# Patient Record
Sex: MALE | Race: BLACK OR AFRICAN AMERICAN | NOT HISPANIC OR LATINO | Employment: STUDENT | URBAN - METROPOLITAN AREA
[De-identification: names, ages, dates, MRNs, and addresses within clinical notes are randomized per-mention and may not be internally consistent; named-entity substitution may affect disease eponyms.]

---

## 2019-07-06 ENCOUNTER — HOSPITAL ENCOUNTER (EMERGENCY)
Facility: HOSPITAL | Age: 24
Discharge: HOME | End: 2019-07-07
Attending: EMERGENCY MEDICINE
Payer: COMMERCIAL

## 2019-07-06 DIAGNOSIS — S76.212A GROIN STRAIN, LEFT, INITIAL ENCOUNTER: ICD-10-CM

## 2019-07-06 DIAGNOSIS — K62.5 RECTAL BLEEDING: Primary | ICD-10-CM

## 2019-07-06 DIAGNOSIS — K59.00 CONSTIPATION: ICD-10-CM

## 2019-07-06 PROCEDURE — 99284 EMERGENCY DEPT VISIT MOD MDM: CPT | Mod: ,,, | Performed by: EMERGENCY MEDICINE

## 2019-07-06 PROCEDURE — 99284 PR EMERGENCY DEPT VISIT,LEVEL IV: ICD-10-PCS | Mod: ,,, | Performed by: EMERGENCY MEDICINE

## 2019-07-06 PROCEDURE — 99283 EMERGENCY DEPT VISIT LOW MDM: CPT

## 2019-07-06 RX ORDER — POLYETHYLENE GLYCOL 3350 17 G/17G
17 POWDER, FOR SOLUTION ORAL DAILY
Qty: 507 G | Refills: 0 | Status: SHIPPED | OUTPATIENT
Start: 2019-07-06

## 2019-07-07 VITALS
HEART RATE: 82 BPM | TEMPERATURE: 99 F | OXYGEN SATURATION: 96 % | SYSTOLIC BLOOD PRESSURE: 107 MMHG | RESPIRATION RATE: 18 BRPM | BODY MASS INDEX: 18.64 KG/M2 | DIASTOLIC BLOOD PRESSURE: 57 MMHG | HEIGHT: 68 IN | WEIGHT: 123 LBS

## 2019-07-07 NOTE — DISCHARGE INSTRUCTIONS
Follow-up with primary provider on Wednesday, pursue anoscope study.  Return to the ED immediately if you develop substantial bleeding from rectum, fever, vomiting or severe constipation unrelieved by MiraLax.    Our goal in the emergency department is to always give you outstanding care and exceptional service. You may receive a survey by mail or e-mail in the next week regarding your experience in our ED. We would greatly appreciate your completing and returning the survey. Your feedback provides us with a way to recognize our staff who give very good care and it helps us learn how to improve when your experience was below our aspiration of excellence.

## 2019-07-07 NOTE — ED TRIAGE NOTES
"Jose Francisco Dotson, a 23 y.o. male presents to the ED w/ complaint of lower back and abdominal pain. Pt reports intermittent lower back pain that radiates to LLE. PT denies recent trauma/injury. Pt denies numbness/tingling in LLE. Pt also reports constipation. Pt stated he passed loose stool yesterday morning for the first time in six days after taking mag citrate and OTC stool softeners. Pt reports "drops of blood" every time he tries to go to the bathroom.     Triage note:  Chief Complaint   Patient presents with    Back Pain     C/o lower back pain that radiates into LLE x 2 weeks. C/o abd pain and constipation x 6 days. Took mag citrate and passed stool yesterday morning. States has been unable to pass stool since but has having bright red rectal bleeding.      Review of patient's allergies indicates:   Allergen Reactions    Sulfa (sulfonamide antibiotics) Other (See Comments)     Fever     Strawberries [strawberry]      Past Medical History:   Diagnosis Date    Allergy      Adult Physical Assessment  LOC: Jose Francisco Dotson, 23 y.o. male verified via two identifiers.  The patient is awake, alert, oriented and speaking appropriately at this time.  APPEARANCE: Patient resting comfortably and appears to be in no acute distress at this time. Patient is clean and well groomed, patient's clothing is properly fastened.  SKIN:The skin is warm and dry, color consistent with ethnicity, patient has normal skin turgor and moist mucus membranes, skin intact, no breakdown or brusing noted.  MUSCULOSKELETAL: Patient moving all extremities well, no obvious swelling or deformities noted. Pt reports lower back pain that radiates down LLE.  RESPIRATORY: Airway is open and patent, respirations are spontaneous, patient has a normal effort and rate, no accessory muscle use noted.  CARDIAC: Patient has a normal rate and rhythm, no periphreal edema noted in any extremity, capillary refill < 3 seconds in all extremities. Clubbing noted to " patient's fingernails.  ABDOMEN: Soft and non tender to palpation, no abdominal distention noted. Bowel sounds present in all four quadrants. Pt denies N/V. Pt reports constipation for 6 days.  NEUROLOGIC: Eyes open spontaneously, behavior appropriate to situation, follows commands, facial expression symmetrical, bilateral hand grasp equal and even, purposeful motor response noted, normal sensation in all extremities when touched with a finger. Denies numbness/tingling.

## 2019-07-07 NOTE — ED PROVIDER NOTES
Encounter Date: 7/6/2019       History     Chief Complaint   Patient presents with    Back Pain     C/o lower back pain that radiates into LLE x 2 weeks. C/o abd pain and constipation x 6 days. Took mag citrate and passed stool yesterday morning. States has been unable to pass stool since but has having bright red rectal bleeding.      Mr Dotson is a 23yoM who presents for left thigh pain and BRBPR; pertinent PMHx history syphilis (treated), high-risk sexual behavior.  Patient sources several weeks of bilateral lumbar back pain; he took Motrin for this pain several days ago and has been pain-free since, denies pain at this time.  He does endorse left upper thigh pain that began around time of back pain. Pain is localized proximal left thigh near the groin.  Patient also endorses constipation last week for 1 week duration.  He took multiple doses of Mag citrate and has had loose stool over the past few days.  Today he endorses bowel movement hesitancy feel like there is something blocking in my rectum.  Upon wiping and in toilet water he had a small amount of BRBPR.  Isolated episode without similar history.  Patient denies anal trauma, fever/chills, anal pain, testicular pain, penile discharge, painful/painless lesion, nausea/vomiting, numbness or weakness to BLE, urinary hesitancy or dysuria.  The patients available PMH, PSH, Social History, medications, allergies, and triage vital signs were reviewed just prior to their medical evaluation.  A ten point review of systems was completed and is negative except as documented above.  Patient denies any other acute medical complaint.          Review of patient's allergies indicates:   Allergen Reactions    Sulfa (sulfonamide antibiotics) Other (See Comments)     Fever     Strawberries [strawberry]      Past Medical History:   Diagnosis Date    Allergy      History reviewed. No pertinent surgical history.  Family History   Problem Relation Age of Onset    Cancer  Father      Social History     Tobacco Use    Smoking status: Never Smoker    Smokeless tobacco: Never Used   Substance Use Topics    Alcohol use: No     Alcohol/week: 0.0 oz    Drug use: No     Review of Systems   Constitutional: Negative for chills and fever.   Respiratory: Negative for cough and shortness of breath.    Cardiovascular: Negative for chest pain and palpitations.   Gastrointestinal: Positive for anal bleeding. Negative for abdominal distention, abdominal pain, blood in stool, constipation, diarrhea, nausea and vomiting.   Genitourinary: Negative for decreased urine volume, discharge, dysuria, flank pain, frequency, hematuria, scrotal swelling and testicular pain.   Musculoskeletal: Positive for myalgias. Negative for arthralgias, back pain, gait problem, joint swelling, neck pain and neck stiffness.   Skin: Negative for pallor and rash.   Neurological: Negative for dizziness, weakness and headaches.   Psychiatric/Behavioral: Negative for confusion.       Physical Exam     Initial Vitals [07/06/19 2212]   BP Pulse Resp Temp SpO2   122/77 88 16 99 °F (37.2 °C) 98 %      MAP       --         Physical Exam    Vitals reviewed.  Constitutional: He appears well-developed and well-nourished. He is not diaphoretic. No distress.   Well-appearing male in NAD, VSS, afebrile, 96% on RA.     HENT:   Head: Normocephalic and atraumatic.   Right Ear: External ear normal.   Left Ear: External ear normal.   Nose: Nose normal.   Mouth/Throat: Oropharynx is clear and moist. No oropharyngeal exudate.   Eyes: Conjunctivae and EOM are normal. Pupils are equal, round, and reactive to light. No scleral icterus.   Neck: Normal range of motion. Neck supple.   Cardiovascular: Normal rate, regular rhythm and intact distal pulses.   Pulmonary/Chest: Breath sounds normal.   Abdominal: Soft. There is no tenderness.   Genitourinary: Rectal exam shows guaiac positive stool. Guaiac positive stool. :  Acceptable.  Genitourinary Comments: Very minimal stool in rectal vault, guaiac positive stool.  Tenderness near flexure.  No appreciable mass felt. No external abnormalities to anus, no anal fissure   Musculoskeletal: Normal range of motion. He exhibits tenderness. He exhibits no edema.   LLE:  No bony tenderness throughout left hip or femur.   All muscle compartments are soft.  No inguinal lymphadenopathy to the left.  No overlying skin changes. + tenderness to proximal quadriceps muscle groups, + reproducible pain with hip flexion only, no pain with ab/adduction, hamstrings, quad tendon, foreleg, gluteus.   No bony tenderness throughout L-spine, SI joint or pelvis.   Neurological: He is alert and oriented to person, place, and time. He has normal strength. No cranial nerve deficit or sensory deficit.   Skin: Skin is warm and dry. Capillary refill takes less than 2 seconds. No rash noted. No erythema. No pallor.   Psychiatric: He has a normal mood and affect. His behavior is normal. Judgment and thought content normal.         ED Course   Procedures  Labs Reviewed - No data to display       Imaging Results          X-Ray Abdomen Flat And Erect (Final result)  Result time 07/07/19 00:00:15    Final result by Nikko Beltran MD (07/07/19 00:00:15)                 Impression:      Nonobstructed bowel-gas pattern.      Electronically signed by: Nikko Beltran MD  Date:    07/07/2019  Time:    00:00             Narrative:    EXAMINATION:  XR ABDOMEN FLAT AND ERECT    CLINICAL HISTORY:  Constipation, unspecified    TECHNIQUE:  Flat and erect AP views of the abdomen were preformed.    COMPARISON:  None    FINDINGS:  There are no calcifications overlying the kidneys.  Bowel gas pattern is non-obstructive.  No evidence for pneumoperitoneum.  Regional osseous structures are unremarkable.                                 Medical Decision Making:   History:   Old Medical Records: I decided to obtain old medical  records.  Old Records Summarized: records from clinic visits and records from previous admission(s).  Initial Assessment:   Patient presents for constipation 2/2 rectal hesitancy and left groin strain + pain with hip flexion only, VSS, afebrile  Differential Diagnosis:   DDx includes groin strain, internal hemorrhoid, rectal mass. Physical exam and history taking lower clinical suspicion for upper GI bleed (BRBPR x 1 with minimal stool in rectal vault, no abd pain), external hemorrhoid, anal fissure, hip trauma or dislocation, bony neoplasm, STI, refer testicular pain.  Clinical Tests:   Radiological Study: Ordered and Reviewed  ED Management:  KUB reviewed without free air or concern for ileus.  Physical exam most consistent with a groin strain, as tenderness is limited to proximal quadriceps muscles only.  Recommended conservative management.  I am concerned for rectal hesitancy the rectal exam is largely unremarkable. Patient needs very close follow-up with anoscope given history of risky sexual behavior and prior syphilis infection.  Patient has a PCP who he can follow up with this week, recommended inquiring about anoscope and further management of rectal sensation.  Given MiraLax Rx. Patient agreed to plan of care and voiced understanding. Discharged in stable condition with strict ED return precautions.    Paulina Maier PA-C  07/09/2019    I discussed the following case, diagnosis and plan of care with attending physician.                Attending Attestation:     Physician Attestation Statement for NP/PA:   I have conducted a face to face encounter with this patient in addition to the NP/PA, due to Medical Complexity    Other NP/PA Attestation Additions:    History of Present Illness: 23M with PMH HIV on PrEP, secondary syphilis s/p tx, presenting with bilateral lumbar paraspinal pain for two weeks, however he endorses resolution of this pain yesterday. Also endorses 1 wk of constipation, painful BM, had  large BM after Mag citrate.  No unintentional weight changes, no changes in appetite or nausea or vomiting.   Physical Exam: NAD, NCAT, A&Ox3, PERRL and EOMI, neck supple/normal ROM, CTAB, RRR no mrg, normal extremity ROM/m/s, abd s/nt/nd, genital exam unremarkable without penile or scrotal tenderness to palpation or lesions, no inguinal lymphadenopathy, no LE edema, skin dry and warm. Rectal exam deferred to PA.   Medical Decision Making: Patient has no abdominal tenderness or inguinal lymphadenopathy concerning for abdominal pathology, no lesions appreciated on genital or rectal exam.  Rectal bleed may be due to internal hemorrhoids or irritation from recent constipation, however given changes in stooling with MSM history strongly recommended patient follow up for anoscopy with his PCP.    I discussed the patient's care with Advanced Practice Clinician, and did see this patient with the APC.  I reviewed their note and agree with the history, physical, assessment, diagnosis, treatment, and disposition plan provided by the Advanced Practice Clinician.                     Clinical Impression:       ICD-10-CM ICD-9-CM   1. Rectal bleeding K62.5 569.3   2. Groin strain, left, initial encounter S76.212A 848.8   3. Constipation K59.00 564.00         Disposition:   Disposition: Discharged  Condition: Stable                        Paulina Maier PA-C  07/09/19 1139       Manjula Bonner MD  07/15/19 1938